# Patient Record
Sex: FEMALE | Race: OTHER | URBAN - METROPOLITAN AREA
[De-identification: names, ages, dates, MRNs, and addresses within clinical notes are randomized per-mention and may not be internally consistent; named-entity substitution may affect disease eponyms.]

---

## 2024-05-13 ENCOUNTER — OFFICE VISIT (OUTPATIENT)
Dept: AUDIOLOGY | Facility: CLINIC | Age: 59
End: 2024-05-13

## 2024-05-13 ENCOUNTER — OFFICE VISIT (OUTPATIENT)
Dept: OTOLARYNGOLOGY | Facility: CLINIC | Age: 59
End: 2024-05-13

## 2024-05-13 DIAGNOSIS — J30.9 CHRONIC ALLERGIC RHINITIS: ICD-10-CM

## 2024-05-13 DIAGNOSIS — H93.11 TINNITUS, RIGHT: Primary | ICD-10-CM

## 2024-05-13 DIAGNOSIS — R09.A2 GLOBUS SENSATION: ICD-10-CM

## 2024-05-13 DIAGNOSIS — K21.00 GASTROESOPHAGEAL REFLUX DISEASE WITH ESOPHAGITIS, UNSPECIFIED WHETHER HEMORRHAGE: Primary | ICD-10-CM

## 2024-05-13 DIAGNOSIS — H90.3 SENSORINEURAL HEARING LOSS (SNHL) OF BOTH EARS: ICD-10-CM

## 2024-05-13 PROCEDURE — 92557 COMPREHENSIVE HEARING TEST: CPT | Performed by: AUDIOLOGIST

## 2024-05-13 PROCEDURE — 92567 TYMPANOMETRY: CPT | Performed by: AUDIOLOGIST

## 2024-05-13 RX ORDER — LOSARTAN POTASSIUM 100 MG/1
100 TABLET ORAL DAILY
COMMUNITY
Start: 2024-05-06

## 2024-05-13 RX ORDER — METFORMIN HYDROCHLORIDE 500 MG/1
TABLET, EXTENDED RELEASE ORAL
COMMUNITY
Start: 2024-03-27

## 2024-05-13 RX ORDER — OMEPRAZOLE 40 MG/1
40 CAPSULE, DELAYED RELEASE ORAL
Qty: 60 CAPSULE | Refills: 0 | Status: SHIPPED | OUTPATIENT
Start: 2024-05-13 | End: 2024-06-12

## 2024-05-13 RX ORDER — HYDROXYZINE HYDROCHLORIDE 25 MG/1
TABLET, FILM COATED ORAL
COMMUNITY
Start: 2024-05-12

## 2024-05-13 NOTE — PROGRESS NOTES
Nery David is a 59 year old female.   Chief Complaint   Patient presents with    Throat Problem     Throat closes    Ear Problem     Right ringing in ear     Sinus Problem     Sinus pressure, right side difficulty breathing        ASSESSMENT AND PLAN:   1. Gastroesophageal reflux disease with esophagitis, unspecified whether hemorrhage  This is a 59-year-old who presents with multiple complaints including a throat sensation that is closing almost like a globus sensation.  She also reports ringing in her ears worse on the right side as well as sinus pressure.    On flexible laryngoscopy she had very prominent erythema of her arytenoids.  Otherwise no masses or lesions.  She had a normal ear exam and no signs of sinusitis.  She had normal tympanograms on each side.  She had mild presbycusis in each ear slightly worse on the right side    Her throat symptoms are consistent with possible chronic GERD and globus sensation.  She did have prominent erythema on the flexible laryngoscopy.  She does admit to eating a lot of spicy foods.  Discussed diet changes and also will try omeprazole 40 mg twice a day for 1 month.  Discussed the use of this medication in detail.  I reassured her there is no fluid and that she had normal eustachian tube function on the tympanograms.  She does have slightly worse chronic SNHL on the right which could predispose her to the ringing that she is having in this ear.  Will currently observe with periodic audiogram.  Also with chronic allergies discussed conservative measures for this as well.    2. Sensorineural hearing loss (SNHL) of both ears      3. Globus sensation      4. Chronic allergic rhinitis        The patient indicates understanding of these issues and agrees to the plan.      EXAM:   There were no vitals taken for this visit.    Pertinent exam findings may also be noted above in assessment and plan     System Details   Skin Inspection - Normal.   Constitutional Overall  appearance - Normal.   Head/Face Symmetric, TMJ tenderness not present    Eyes EOMI, PERRL   Right ear:  Canal clear, TM intact, no NANCY   Left ear:  Canal clear, TM intact, no NANCY   Nose: Septum midline, inferior turbinates not enlarged, nasal valves without collapse    Oral cavity/Oropharynx: No lesions or masses on inspection or palpation, tonsils symmetric    Neck: Soft without LAD, thyroid not enlarged  Voice clear/ no stridor   Other:      Scopes and Procedures:       Flexible Laryngoscopy Procedure Note (21383)    Due to inability for adequate examination of the larynx and need for magnification to perform the examination, endoscopy was performed.  Risks and benefits were discussed with patient/family and they have given verbal consent to proceed.    Pre-operative Diagnosis:   1. Gastroesophageal reflux disease with esophagitis, unspecified whether hemorrhage    2. Sensorineural hearing loss (SNHL) of both ears    3. Globus sensation    4. Chronic allergic rhinitis        Post-operative Diagnosis: Same    Procedure: Diagnostic flexible fiberoptic laryngoscopy    Anesthesia: Topical anesthetic [place]     Surgeon Tre Kent MD    EBL: 0cc    Procedure Detail & Findings:     After placement of topical anesthetic intranasally the flexible laryngoscope was inserted into the nare and driven through the nasal cavity with no significant abnormal findings noted in the nasal cavities or nasopharynx. The oropharynx, hypopharynx and larynx were subsequently examined and the following findings were noted:        Base of Tongue: Normal        Valeculla: Normal        Arytenoids: Normal/Erythemous: Erythmous        Introitus of the esophagus: Normal        Epiglottis: Normal        False vocal cords: Normal        True vocal cords: Normal        Subglottic space: Normal        Mobility of True vocal cords: Normal    Condition: Stable    Complications: Patient tolerated the procedure well with no immediate  complication.    Tre Kent MD        Current Outpatient Medications   Medication Sig Dispense Refill    losartan 100 MG Oral Tab Take 1 tablet (100 mg total) by mouth daily.      metFORMIN  MG Oral Tablet 24 Hr TAKE 1 TABLET BY MOUTH EVERY DAY. STOP DIABETES      hydrOXYzine 25 MG Oral Tab       Omeprazole 40 MG Oral Capsule Delayed Release Take 1 capsule (40 mg total) by mouth 2 (two) times daily before meals. Before a meal 60 capsule 0      History reviewed. No pertinent past medical history.   Social History:  Social History     Socioeconomic History    Marital status:      Social Determinants of Health      Received from [facility]    Social Connections    Received from [facility]    Housing Stability          Tre Kent MD  5/13/2024  3:49 PM

## 2024-10-03 ENCOUNTER — TELEPHONE (OUTPATIENT)
Dept: ALLERGY | Facility: CLINIC | Age: 59
End: 2024-10-03

## 2024-10-03 NOTE — TELEPHONE ENCOUNTER
Left a message for patient via language line  [place] ID [id number]  with an appointment reminder with Dr. Moran at [address] [place] [address] On 10/15/24 at 2 pm and to please arrive 15 minutes prior to appointment and if she wishes to have skin testing to please refrain for allergy medications 5 days prior. Ay questions to please contact our office.

## 2024-11-05 ENCOUNTER — LAB ENCOUNTER (OUTPATIENT)
Dept: LAB | Facility: HOSPITAL | Age: 59
End: 2024-11-05
Attending: ALLERGY & IMMUNOLOGY
Payer: MEDICAID

## 2024-11-05 ENCOUNTER — OFFICE VISIT (OUTPATIENT)
Dept: ALLERGY | Facility: CLINIC | Age: 59
End: 2024-11-05

## 2024-11-05 ENCOUNTER — HOSPITAL ENCOUNTER (OUTPATIENT)
Dept: GENERAL RADIOLOGY | Facility: HOSPITAL | Age: 59
Discharge: HOME | End: 2024-11-05
Attending: ALLERGY & IMMUNOLOGY
Payer: MEDICAID

## 2024-11-05 VITALS
HEIGHT: 63 IN | OXYGEN SATURATION: 97 % | TEMPERATURE: 99 F | HEART RATE: 120 BPM | SYSTOLIC BLOOD PRESSURE: 119 MMHG | WEIGHT: 160 LBS | BODY MASS INDEX: 28.35 KG/M2 | DIASTOLIC BLOOD PRESSURE: 75 MMHG

## 2024-11-05 DIAGNOSIS — R51.9 NONINTRACTABLE EPISODIC HEADACHE, UNSPECIFIED HEADACHE TYPE: ICD-10-CM

## 2024-11-05 DIAGNOSIS — R05.3 CHRONIC COUGH: ICD-10-CM

## 2024-11-05 DIAGNOSIS — J32.9 CHRONIC SINUSITIS, UNSPECIFIED LOCATION: ICD-10-CM

## 2024-11-05 DIAGNOSIS — R09.82 PND (POST-NASAL DRIP): ICD-10-CM

## 2024-11-05 DIAGNOSIS — J32.9 CHRONIC SINUSITIS, UNSPECIFIED LOCATION: Primary | ICD-10-CM

## 2024-11-05 PROCEDURE — 86003 ALLG SPEC IGE CRUDE XTRC EA: CPT | Performed by: ALLERGY & IMMUNOLOGY

## 2024-11-05 PROCEDURE — 36415 COLL VENOUS BLD VENIPUNCTURE: CPT | Performed by: ALLERGY & IMMUNOLOGY

## 2024-11-05 PROCEDURE — 70220 X-RAY EXAM OF SINUSES: CPT | Performed by: ALLERGY & IMMUNOLOGY

## 2024-11-05 PROCEDURE — 99204 OFFICE O/P NEW MOD 45 MIN: CPT | Performed by: ALLERGY & IMMUNOLOGY

## 2024-11-05 PROCEDURE — 82785 ASSAY OF IGE: CPT | Performed by: ALLERGY & IMMUNOLOGY

## 2024-11-05 RX ORDER — FLUOXETINE 40 MG/1
40 CAPSULE ORAL DAILY
COMMUNITY

## 2024-11-05 RX ORDER — AMOXICILLIN 500 MG/1
500 TABLET, FILM COATED ORAL 2 TIMES DAILY
COMMUNITY
Start: 2024-08-09

## 2024-11-05 RX ORDER — ALPRAZOLAM 0.5 MG
0.5 TABLET ORAL NIGHTLY PRN
COMMUNITY

## 2024-11-05 RX ORDER — MONTELUKAST SODIUM 10 MG/1
10 TABLET ORAL EVERY EVENING
COMMUNITY
Start: 2024-10-21

## 2024-11-05 RX ORDER — IBUPROFEN 800 MG/1
800 TABLET, FILM COATED ORAL EVERY 6 HOURS PRN
COMMUNITY
Start: 2024-10-02

## 2024-11-05 RX ORDER — PANTOPRAZOLE SODIUM 40 MG/1
40 TABLET, DELAYED RELEASE ORAL DAILY
COMMUNITY

## 2024-11-05 RX ORDER — AMOXICILLIN 500 MG/1
500 CAPSULE ORAL EVERY 8 HOURS
COMMUNITY
Start: 2024-09-16

## 2024-11-05 RX ORDER — ERGOCALCIFEROL 1.25 MG/1
50000 CAPSULE, LIQUID FILLED ORAL WEEKLY
COMMUNITY
Start: 2024-05-29

## 2024-11-05 RX ORDER — ATORVASTATIN CALCIUM 10 MG/1
10 TABLET, FILM COATED ORAL DAILY
COMMUNITY

## 2024-11-05 RX ORDER — FLUTICASONE PROPIONATE 50 MCG
SPRAY, SUSPENSION (ML) NASAL
COMMUNITY
Start: 2024-08-30

## 2024-11-05 RX ORDER — CETIRIZINE HYDROCHLORIDE 10 MG/1
10 TABLET ORAL DAILY
COMMUNITY

## 2024-11-05 RX ORDER — ONDANSETRON 8 MG/1
8 TABLET, ORALLY DISINTEGRATING ORAL EVERY 8 HOURS PRN
COMMUNITY
Start: 2024-08-11

## 2024-11-05 RX ORDER — AZELASTINE HYDROCHLORIDE 0.5 MG/ML
SOLUTION/ DROPS OPHTHALMIC
COMMUNITY
Start: 2024-10-28

## 2024-11-05 RX ORDER — BUSPIRONE HYDROCHLORIDE 7.5 MG/1
7.5 TABLET ORAL 2 TIMES DAILY
COMMUNITY

## 2024-11-05 RX ORDER — HYDROXYZINE HYDROCHLORIDE 10 MG/1
10 TABLET, FILM COATED ORAL
COMMUNITY

## 2024-11-05 RX ORDER — LOSARTAN POTASSIUM 50 MG/1
50 TABLET ORAL DAILY
COMMUNITY

## 2024-11-05 NOTE — H&P
Nery David is a 59 year old female.    HPI:     Chief Complaint   Patient presents with    Sinus Problem     Patient present with sinus concerns, has sinus pressure  and has white nasal congestion, at times has fever, states has had for the past year or so, has earaches and at times sore throat.       Patient is a 59-year-old Kazakh-speaking female, with  as , referred by primary care physician assistant for new consult for  Chronic rhinosinusitis, postnasal drainage, throat clearing, chronic cough, sinus headache.    Patient denies previous sinus imaging or previous ENT sinus specialist consultation.    Patient lives in a 70-year-old house for 2 years.  The house has forced air heat and central air conditioning.  The house has a dry basement.  The patient sleeps on a 4-year-old mattress and spring.  She has no feather pillows nor comforters.  The bedroom floor is wood the patient sits on a 3-year-old upholstered sofa.  There are no smokers.  The patient has a a pet dog for 2 years, which has access to the bedroom, but does not sleep on bed with patient.  Patient enjoys outdoor activities, including walking her dog in the park.      HISTORY:  History reviewed. No pertinent past medical history.   History reviewed. No pertinent surgical history.   History reviewed. No pertinent family history.   Social History:   Social History     Socioeconomic History    Marital status:    Tobacco Use    Smoking status: Never     Passive exposure: Never    Smokeless tobacco: Never   Substance and Sexual Activity    Alcohol use: Never    Drug use: Never     Social Drivers of Health      Received from [facility]    Social Connections    Received from [facility]    Housing Stability        Medications (Active prior to today's visit):  Current Outpatient Medications   Medication Sig Dispense Refill    ondansetron 8 MG Oral Tablet Dispersible 1 tablet (8 mg total)  every 8 (eight) hours as needed for Nausea.      pantoprazole 40 MG Oral Tab EC Take 1 tablet (40 mg total) by mouth daily.      fluticasone propionate 50 MCG/ACT Nasal Suspension SHAKE LIQUID AND USE 1 SPRAY IN EACH NOSTRIL TWICE DAILY AS NEEDED FOR CONGESTION      montelukast 10 MG Oral Tab Take 1 tablet (10 mg total) by mouth every evening.      ALPRAZolam 0.5 MG Oral Tab Take 1 tablet (0.5 mg total) by mouth nightly as needed.      atorvastatin 10 MG Oral Tab Take 1 tablet (10 mg total) by mouth daily.      Azelastine HCl 0.05 % Ophthalmic Solution       cetirizine 10 MG Oral Tab Take 1 tablet (10 mg total) by mouth daily.      FLUoxetine HCl 40 MG Oral Cap Take 1 capsule (40 mg total) by mouth daily.      ibuprofen 800 MG Oral Tab Take 1 tablet (800 mg total) by mouth every 6 (six) hours as needed.      losartan 100 MG Oral Tab Take 1 tablet (100 mg total) by mouth daily.      metFORMIN  MG Oral Tablet 24 Hr TAKE 1 TABLET BY MOUTH EVERY DAY. STOP DIABETES      Acetaminophen 325 MG Oral Cap Take by mouth As Directed. (Patient not taking: Reported on 11/5/2024)      PSEUDOEPHEDRINE-ACETAMINOPHEN OR Take by mouth As Directed. (Patient not taking: Reported on 11/5/2024)      amoxicillin 500 MG Oral Cap Take 1 capsule (500 mg total) by mouth every 8 (eight) hours. (Patient not taking: Reported on 11/5/2024)      amoxicillin 500 MG Oral Tab Take 1 tablet (500 mg total) by mouth 2 (two) times daily. (Patient not taking: Reported on 11/5/2024)      amoxicillin clavulanate 875-125 MG Oral Tab Take 1 tablet by mouth 2 (two) times daily. (Patient not taking: Reported on 11/5/2024)      busPIRone HCl 7.5 MG Oral Tab Take 1 tablet (7.5 mg total) by mouth 2 (two) times daily. (Patient not taking: Reported on 11/5/2024)      ergocalciferol 1.25 MG (37450 UT) Oral Cap Take 1 capsule (50,000 Units total) by mouth once a week. ON SUNDAY (Patient not taking: Reported on 11/5/2024)      hydrOXYzine 10 MG Oral Tab Take 1  tablet (10 mg total) by mouth. (Patient not taking: Reported on 11/5/2024)      losartan 50 MG Oral Tab Take 1 tablet (50 mg total) by mouth daily. (Patient not taking: Reported on 11/5/2024)      metFORMIN 500 MG Oral Tab Take 1 tablet (500 mg total) by mouth daily. (Patient not taking: Reported on 11/5/2024)         Allergies:  Allergies[1]      ROS:   Review of Systems   Constitutional:  Negative for activity change, appetite change, chills, diaphoresis, fatigue, fever and unexpected weight change.   HENT:  Positive for congestion, postnasal drip and sinus pressure. Negative for ear discharge, ear pain, facial swelling, hearing loss, mouth sores, rhinorrhea, sinus pain, sneezing, sore throat, tinnitus, trouble swallowing and voice change.    Eyes:  Negative for pain, discharge, redness and itching.   Respiratory:  Positive for cough. Negative for apnea, choking, chest tightness, shortness of breath, wheezing and stridor.    Cardiovascular:  Negative for chest pain and palpitations.   Gastrointestinal:  Negative for abdominal distention, abdominal pain, constipation, diarrhea, nausea and vomiting.   Endocrine: Negative for cold intolerance and heat intolerance.   Musculoskeletal:  Negative for arthralgias, joint swelling and myalgias.   Skin:  Negative for pallor and rash.   Allergic/Immunologic: Positive for environmental allergies. Negative for food allergies and immunocompromised state.   Neurological:  Negative for headaches.   Psychiatric/Behavioral:  Negative for behavioral problems and sleep disturbance.           PHYSICAL EXAM:   Physical Exam  Constitutional:       Appearance: She is normal weight.   HENT:      Head: No right periorbital erythema or left periorbital erythema.      Right Ear: Tympanic membrane normal. There is no impacted cerumen.      Left Ear: Tympanic membrane normal. There is no impacted cerumen.      Nose: No congestion or rhinorrhea.      Right Turbinates: Swollen and pale.      Left  Turbinates: Swollen and pale.      Mouth/Throat:      Pharynx: No oropharyngeal exudate or posterior oropharyngeal erythema.   Eyes:      General: Lids are normal. No allergic shiner.        Right eye: No discharge.         Left eye: No discharge.      Conjunctiva/sclera: Conjunctivae normal.      Right eye: Right conjunctiva is not injected. No exudate.     Left eye: Left conjunctiva is not injected. No exudate.  Cardiovascular:      Rate and Rhythm: Normal rate and regular rhythm.      Heart sounds: Normal heart sounds.   Pulmonary:      Effort: No tachypnea, prolonged expiration or respiratory distress.      Breath sounds: No stridor or decreased air movement. No decreased breath sounds, wheezing, rhonchi or rales.   Skin:     Coloration: Skin is not cyanotic or pale.      Findings: No acne, ecchymosis, erythema, petechiae or rash. Rash is not macular, nodular, papular, purpuric, pustular, scaling, urticarial or vesicular.           ASSESSMENT   Assessment   Encounter Diagnoses   Name Primary?    Chronic sinusitis, unspecified location Yes    PND (post-nasal drip)     Chronic cough     Nonintractable episodic headache, unspecified headache type        PLAN     We will send patient for blood test-RAST testing for aeroallergens-adult profile-[place] region 8.    We will order sinus x-rays 3 views.    Patient will follow-up in 3 to 4 weeks.    In future patient may need to consider consultation with ENT sinus specialist for evaluation, possible sinus endoscopy, treatment of chronic sinusitis.       Orders This Visit:  Orders Placed This Encounter   Procedures    Allergy Region 8       Meds This Visit:  Requested Prescriptions      No prescriptions requested or ordered in this encounter       Imaging & Referrals:  XR SINUSES, PARANASAL, COMPLETE (MIN 3 VIEWS)(CPT=70220)     11/5/2024  Madhu Moran MD      If medication samples were provided today, they were provided solely for patient education and training related  to self administration of these medications.  Teaching, instruction and sample was provided to the patient by myself.  Teaching included  a review of potential adverse side effects as well as potential efficacy.  Patient's questions were answered in regards to medication administration and dosing and potential side effects. Teaching was provided via the teach back method       [1]   Allergies  Allergen Reactions    Naproxen OTHER (SEE COMMENTS)

## 2024-11-05 NOTE — PATIENT INSTRUCTIONS
We will send patient for blood test-RAST testing-to aeroallergens, adult profile, [place] region 8.    We will also send patient for sinus x-ray, 3 views.    Patient will follow-up in 3 to 4 weeks.    In future patient may need to consult an ENT sinus specialist for evaluation, possible sinus endoscopy, treatment of chronic sinusitis.

## 2024-11-06 ENCOUNTER — TELEPHONE (OUTPATIENT)
Dept: ALLERGY | Facility: CLINIC | Age: 59
End: 2024-11-06

## 2024-11-06 LAB
A ALTERNATA IGE QN: <0.1 KUA/L (ref ?–0.1)
A FUMIGATUS IGE QN: <0.1 KUA/L (ref ?–0.1)
AMER SYCAMORE IGE QN: 0.6 KUA/L (ref ?–0.1)
BERMUDA GRASS IGE QN: 0.65 KUA/L (ref ?–0.1)
BOXELDER IGE QN: 0.58 KUA/L (ref ?–0.1)
C HERBARUM IGE QN: <0.1 KUA/L (ref ?–0.1)
CALIF WALNUT IGE QN: 0.6 KUA/L (ref ?–0.1)
CAT DANDER IGE QN: <0.1 KUA/L (ref ?–0.1)
CMN PIGWEED IGE QN: 0.51 KUA/L (ref ?–0.1)
COMMON RAGWEED IGE QN: 0.59 KUA/L (ref ?–0.1)
COTTONWOOD IGE QN: 0.55 KUA/L (ref ?–0.1)
D FARINAE IGE QN: 0.74 KUA/L (ref ?–0.1)
D PTERONYSS IGE QN: 0.72 KUA/L (ref ?–0.1)
DOG DANDER IGE QN: <0.1 KUA/L (ref ?–0.1)
IGE SERPL-ACNC: 117 KU/L (ref 2–214)
M RACEMOSUS IGE QN: 0.45 KUA/L (ref ?–0.1)
MARSH ELDER IGE QN: 0.68 KUA/L (ref ?–0.1)
MOUSE EPITH IGE QN: <0.1 KUA/L (ref ?–0.1)
MT JUNIPER IGE QN: 0.53 KUA/L (ref ?–0.1)
P NOTATUM IGE QN: <0.1 KUA/L (ref ?–0.1)
PECAN/HICK TREE IGE QN: 0.55 KUA/L (ref ?–0.1)
ROACH IGE QN: 0.97 KUA/L (ref ?–0.1)
SALTWORT IGE QN: 0.54 KUA/L (ref ?–0.1)
SILVER BIRCH IGE QN: 0.56 KUA/L (ref ?–0.1)
TIMOTHY IGE QN: 0.56 KUA/L (ref ?–0.1)
WHITE ASH IGE QN: 0.6 KUA/L (ref ?–0.1)
WHITE ELM IGE QN: 0.58 KUA/L (ref ?–0.1)
WHITE MULBERRY IGE QN: 0.5 KUA/L (ref ?–0.1)
WHITE OAK IGE QN: 0.55 KUA/L (ref ?–0.1)

## 2024-11-06 NOTE — TELEPHONE ENCOUNTER
Received referral from [facility] for patient to see Dr. Moran. Patient was seen on 11/5/24. Referral sent to scan.

## 2024-11-11 ENCOUNTER — TELEPHONE (OUTPATIENT)
Dept: ALLERGY | Facility: CLINIC | Age: 59
End: 2024-11-11

## 2024-11-11 NOTE — TELEPHONE ENCOUNTER
Pt does not have a follow up with Dr. Moran scheduled as of today.    RN called pt with [place]ese Speaking .  Used Language Line [employer]: [employer] ID#: [id number]    Left message requesting pt please contact our office to schedule a follow up with Dr. Moran so he can go over her results.  Provided call back number to schedule.

## 2024-11-11 NOTE — TELEPHONE ENCOUNTER
----- Message from Madhu Moran sent at 11/5/2024  5:17 PM CST -----  Sinus xrays completely normal. Will discuss with follow up appointment.    BETTY

## 2024-11-11 NOTE — TELEPHONE ENCOUNTER
----- Message from Madhu Moran sent at 11/6/2024  4:39 PM CST -----  Patient with multiple allergies .  Will discuss results during follow up appointment.    BETTY

## 2024-11-26 NOTE — TELEPHONE ENCOUNTER
Pt does not have a follow up with Dr. Moran scheduled as of today.      RN left voicemail using the Icelandic speaking    Used language line solutions: [employer] ID#[id number]  Left message requesting pt please contact our office to schedule a follow up with Dr. Moran so he can go over her results.  Provided call back number to schedule  Second attempt to contact patient

## 2024-12-09 NOTE — TELEPHONE ENCOUNTER
Spoke with language line  [place], [place] [id number] and left a third message for patient to please contact our office to schedule a follow up appointment with Dr. Moran to discuss test results.    IKE Crabtree,our office sent a letter to the patient to please schedule an appointment to discuss test results as several messages have been sent and patient does not have mychart. Please inform how you would like to proceed further.     Last office visit was 11/5/24.

## 2024-12-11 NOTE — TELEPHONE ENCOUNTER
RN left voicemail for patient using the language line  Ella ID# [id number]  Left voicemail for patient to please call office back to go over test results and Dr. Moran's advice/recommendations  Multiple attempts made to reach patient with no response   May go over results when patient call back   No further action required at this time       Per Dr. Moran    Okay to tell patient that her sinus x-ray is normal.  Her RAST test was positive to tree, grass, and weed pollens, which would not cause symptoms this time of year.  Her dust allergy  might be part of her sinus problems.  Would suggest trial of behiond the counter 12 hour Allegra - D in the morning.  If not improved, patient should consult an ENT sinus specialist.  OK for RN to call with above information / instructions.  BETTY

## (undated) NOTE — LETTER
12/9/2024    Nery David  [address] [place] [address] [place] [address]      Estimado/a Sr./Laurita. Frankie,    Nuestra oficina ha intentado comunicarse con usted en referencia a los recientes estudios de laboratorio y brunilda resultados. Por favor, llame a nuestra oficina a dias conveniencia al [phone] para comunicarse con el maria dolores de enfermería.    Atentamente,      Dr. Moran